# Patient Record
Sex: FEMALE | Race: WHITE
[De-identification: names, ages, dates, MRNs, and addresses within clinical notes are randomized per-mention and may not be internally consistent; named-entity substitution may affect disease eponyms.]

---

## 2017-06-05 ENCOUNTER — HOSPITAL ENCOUNTER (OUTPATIENT)
Dept: HOSPITAL 100 - MS2 | Age: 37
Setting detail: OBSERVATION
LOS: 3 days | Discharge: HOME | DRG: 773 | End: 2017-06-08
Payer: MEDICAID

## 2017-06-05 VITALS
HEART RATE: 71 BPM | DIASTOLIC BLOOD PRESSURE: 56 MMHG | TEMPERATURE: 98.42 F | SYSTOLIC BLOOD PRESSURE: 99 MMHG | OXYGEN SATURATION: 97 % | RESPIRATION RATE: 16 BRPM

## 2017-06-05 VITALS
DIASTOLIC BLOOD PRESSURE: 51 MMHG | TEMPERATURE: 97.9 F | SYSTOLIC BLOOD PRESSURE: 103 MMHG | RESPIRATION RATE: 16 BRPM | HEART RATE: 74 BPM

## 2017-06-05 VITALS — OXYGEN SATURATION: 98 %

## 2017-06-05 VITALS — OXYGEN SATURATION: 97 %

## 2017-06-05 VITALS — BODY MASS INDEX: 17.8 KG/M2

## 2017-06-05 VITALS
SYSTOLIC BLOOD PRESSURE: 103 MMHG | TEMPERATURE: 98.2 F | RESPIRATION RATE: 18 BRPM | DIASTOLIC BLOOD PRESSURE: 54 MMHG | HEART RATE: 66 BPM

## 2017-06-05 DIAGNOSIS — M41.9: ICD-10-CM

## 2017-06-05 DIAGNOSIS — K58.0: ICD-10-CM

## 2017-06-05 DIAGNOSIS — F17.200: ICD-10-CM

## 2017-06-05 DIAGNOSIS — G89.29: ICD-10-CM

## 2017-06-05 DIAGNOSIS — F12.90: ICD-10-CM

## 2017-06-05 DIAGNOSIS — F11.23: Primary | ICD-10-CM

## 2017-06-05 DIAGNOSIS — M54.5: ICD-10-CM

## 2017-06-05 DIAGNOSIS — F19.10: ICD-10-CM

## 2017-06-05 LAB
ANION GAP: 5 (ref 5–15)
BUN SERPL-MCNC: 6 MG/DL (ref 7–18)
BUN/CREAT RATIO: 9.1 RATIO (ref 10–20)
CALCIUM SERPL-MCNC: 8.9 MG/DL (ref 8.5–10.1)
CARBON DIOXIDE: 30 MMOL/L (ref 21–32)
CHLORIDE: 107 MMOL/L (ref 98–107)
DEPRECATED RDW RBC: 46 FL (ref 35.1–43.9)
ERYTHROCYTE [DISTWIDTH] IN BLOOD: 13.1 % (ref 11.6–14.6)
EST GLOM FILT RATE - AFR AMER: 132 ML/MIN (ref 60–?)
ESTIMATED CREATININE CLEARANCE: 103.9 ML/MIN
GLUCOSE: 90 MG/DL (ref 70–110)
HCT VFR BLD AUTO: 38.2 % (ref 37–47)
HEMOGLOBIN: 12.9 G/DL (ref 12–15)
HGB BLD-MCNC: 12.9 G/DL (ref 12–15)
MCV RBC: 98.5 FL (ref 81–99)
MEAN CORP HGB CONC: 33.8 G/GL (ref 32–36)
MEAN PLATELET VOL.: 12.4 FL (ref 6.2–12)
PLATELET # BLD: 152 K/MM3 (ref 150–450)
PLATELET COUNT: 152 K/MM3 (ref 150–450)
POTASSIUM: 3.5 MMOL/L (ref 3.5–5.1)
RBC # BLD AUTO: 3.88 M/MM3 (ref 4.2–5.4)
RBC DISTRIBUTION WIDTH CV: 13.1 % (ref 11.6–14.6)
RBC DISTRIBUTION WIDTH SD: 46 FL (ref 35.1–43.9)
SCAN INDICATED ON CBC? Y/N: NO
WBC # BLD AUTO: 6.2 K/MM3 (ref 4.4–11)
WHITE BLOOD COUNT: 6.2 K/MM3 (ref 4.4–11)

## 2017-06-05 PROCEDURE — 80048 BASIC METABOLIC PNL TOTAL CA: CPT

## 2017-06-05 PROCEDURE — 93005 ELECTROCARDIOGRAM TRACING: CPT

## 2017-06-05 PROCEDURE — 85027 COMPLETE CBC AUTOMATED: CPT

## 2017-06-05 PROCEDURE — 99218: CPT

## 2017-06-05 PROCEDURE — 99406 BEHAV CHNG SMOKING 3-10 MIN: CPT

## 2017-06-05 PROCEDURE — G0378 HOSPITAL OBSERVATION PER HR: HCPCS

## 2017-06-05 PROCEDURE — G0379 DIRECT REFER HOSPITAL OBSERV: HCPCS

## 2017-06-06 VITALS
RESPIRATION RATE: 18 BRPM | DIASTOLIC BLOOD PRESSURE: 59 MMHG | HEART RATE: 90 BPM | TEMPERATURE: 98.9 F | SYSTOLIC BLOOD PRESSURE: 84 MMHG

## 2017-06-06 VITALS — OXYGEN SATURATION: 98 %

## 2017-06-06 VITALS
TEMPERATURE: 98.4 F | RESPIRATION RATE: 16 BRPM | SYSTOLIC BLOOD PRESSURE: 94 MMHG | HEART RATE: 74 BPM | DIASTOLIC BLOOD PRESSURE: 56 MMHG | OXYGEN SATURATION: 99 %

## 2017-06-06 VITALS
RESPIRATION RATE: 16 BRPM | HEART RATE: 88 BPM | TEMPERATURE: 98.06 F | SYSTOLIC BLOOD PRESSURE: 103 MMHG | DIASTOLIC BLOOD PRESSURE: 57 MMHG

## 2017-06-06 VITALS
DIASTOLIC BLOOD PRESSURE: 55 MMHG | TEMPERATURE: 98.06 F | HEART RATE: 89 BPM | SYSTOLIC BLOOD PRESSURE: 102 MMHG | RESPIRATION RATE: 18 BRPM

## 2017-06-06 VITALS
HEART RATE: 81 BPM | RESPIRATION RATE: 18 BRPM | DIASTOLIC BLOOD PRESSURE: 77 MMHG | SYSTOLIC BLOOD PRESSURE: 95 MMHG | TEMPERATURE: 98.96 F

## 2017-06-06 RX ADMIN — PRAMIPEXOLE DIHYDROCHLORIDE 0.12 MG: 0.12 TABLET ORAL at 22:12

## 2017-06-06 RX ADMIN — PRAMIPEXOLE DIHYDROCHLORIDE 0.12 MG: 0.12 TABLET ORAL at 11:36

## 2017-06-07 VITALS
RESPIRATION RATE: 16 BRPM | HEART RATE: 67 BPM | TEMPERATURE: 97.88 F | DIASTOLIC BLOOD PRESSURE: 45 MMHG | OXYGEN SATURATION: 98 % | SYSTOLIC BLOOD PRESSURE: 86 MMHG

## 2017-06-07 VITALS
RESPIRATION RATE: 20 BRPM | DIASTOLIC BLOOD PRESSURE: 63 MMHG | HEART RATE: 78 BPM | SYSTOLIC BLOOD PRESSURE: 91 MMHG | TEMPERATURE: 97.9 F

## 2017-06-07 VITALS
HEART RATE: 65 BPM | SYSTOLIC BLOOD PRESSURE: 97 MMHG | TEMPERATURE: 97.5 F | DIASTOLIC BLOOD PRESSURE: 55 MMHG | RESPIRATION RATE: 18 BRPM

## 2017-06-07 VITALS
TEMPERATURE: 97.9 F | DIASTOLIC BLOOD PRESSURE: 63 MMHG | HEART RATE: 72 BPM | SYSTOLIC BLOOD PRESSURE: 91 MMHG | RESPIRATION RATE: 18 BRPM

## 2017-06-07 VITALS
RESPIRATION RATE: 18 BRPM | DIASTOLIC BLOOD PRESSURE: 51 MMHG | SYSTOLIC BLOOD PRESSURE: 94 MMHG | TEMPERATURE: 97.7 F | HEART RATE: 69 BPM

## 2017-06-07 VITALS — OXYGEN SATURATION: 98 %

## 2017-06-07 RX ADMIN — PRAMIPEXOLE DIHYDROCHLORIDE 0.12 MG: 0.12 TABLET ORAL at 22:53

## 2017-06-07 RX ADMIN — PRAMIPEXOLE DIHYDROCHLORIDE 0.12 MG: 0.12 TABLET ORAL at 08:10

## 2017-06-08 VITALS
TEMPERATURE: 97.7 F | SYSTOLIC BLOOD PRESSURE: 108 MMHG | OXYGEN SATURATION: 100 % | HEART RATE: 66 BPM | RESPIRATION RATE: 16 BRPM | DIASTOLIC BLOOD PRESSURE: 59 MMHG

## 2017-06-08 VITALS
SYSTOLIC BLOOD PRESSURE: 100 MMHG | TEMPERATURE: 97.52 F | RESPIRATION RATE: 16 BRPM | DIASTOLIC BLOOD PRESSURE: 52 MMHG | HEART RATE: 56 BPM

## 2017-06-08 VITALS
DIASTOLIC BLOOD PRESSURE: 59 MMHG | HEART RATE: 66 BPM | RESPIRATION RATE: 16 BRPM | TEMPERATURE: 97.7 F | SYSTOLIC BLOOD PRESSURE: 108 MMHG

## 2017-06-08 RX ADMIN — PRAMIPEXOLE DIHYDROCHLORIDE 0.12 MG: 0.12 TABLET ORAL at 09:19

## 2018-01-18 ENCOUNTER — HOSPITAL ENCOUNTER (OUTPATIENT)
Age: 38
Setting detail: OBSERVATION
LOS: 3 days | Discharge: HOME | DRG: 435 | End: 2018-01-21
Payer: MEDICAID

## 2018-01-18 VITALS
SYSTOLIC BLOOD PRESSURE: 97 MMHG | HEART RATE: 60 BPM | DIASTOLIC BLOOD PRESSURE: 51 MMHG | RESPIRATION RATE: 18 BRPM | TEMPERATURE: 98.4 F

## 2018-01-18 VITALS
SYSTOLIC BLOOD PRESSURE: 126 MMHG | TEMPERATURE: 98.1 F | HEART RATE: 56 BPM | RESPIRATION RATE: 16 BRPM | DIASTOLIC BLOOD PRESSURE: 61 MMHG

## 2018-01-18 VITALS
DIASTOLIC BLOOD PRESSURE: 51 MMHG | OXYGEN SATURATION: 100 % | TEMPERATURE: 98.4 F | SYSTOLIC BLOOD PRESSURE: 97 MMHG | RESPIRATION RATE: 18 BRPM | HEART RATE: 60 BPM

## 2018-01-18 VITALS
TEMPERATURE: 98.06 F | SYSTOLIC BLOOD PRESSURE: 126 MMHG | HEART RATE: 56 BPM | DIASTOLIC BLOOD PRESSURE: 61 MMHG | RESPIRATION RATE: 16 BRPM | OXYGEN SATURATION: 100 %

## 2018-01-18 VITALS
RESPIRATION RATE: 16 BRPM | HEART RATE: 71 BPM | SYSTOLIC BLOOD PRESSURE: 103 MMHG | TEMPERATURE: 97.9 F | DIASTOLIC BLOOD PRESSURE: 72 MMHG

## 2018-01-18 VITALS — BODY MASS INDEX: 18.7 KG/M2

## 2018-01-18 DIAGNOSIS — G25.81: ICD-10-CM

## 2018-01-18 DIAGNOSIS — R00.1: ICD-10-CM

## 2018-01-18 DIAGNOSIS — F11.23: Primary | ICD-10-CM

## 2018-01-18 DIAGNOSIS — F17.210: ICD-10-CM

## 2018-01-18 LAB
ALANINE AMINOTRANSFER ALT/SGPT: 22 U/L (ref 12–78)
ALBUMIN SERPL-MCNC: 3.2 G/DL (ref 3.4–5)
ALKALINE PHOSPHATASE: 74 U/L (ref 45–117)
ANION GAP: 5 (ref 5–15)
AST(SGOT): 23 U/L (ref 15–37)
BUN SERPL-MCNC: 10 MG/DL (ref 7–18)
BUN/CREAT RATIO: 15.2 RATIO (ref 10–20)
CALCIUM SERPL-MCNC: 8.5 MG/DL (ref 8.5–10.1)
CARBON DIOXIDE: 30 MMOL/L (ref 21–32)
CHLORIDE: 105 MMOL/L (ref 98–107)
DEPRECATED RDW RBC: 48.2 FL (ref 35.1–43.9)
DIFFERENTIAL INDICATED: (no result)
ERYTHROCYTE [DISTWIDTH] IN BLOOD: 13.4 % (ref 11.6–14.6)
EST GLOM FILT RATE - AFR AMER: 131 ML/MIN (ref 60–?)
ESTIMATED CREATININE CLEARANCE: 104.79 ML/MIN
GLOBULIN: 3.4 G/DL (ref 2.2–4.2)
GLUCOSE: 110 MG/DL (ref 70–110)
HCT VFR BLD AUTO: 35.6 % (ref 37–47)
HEMOGLOBIN: 11.9 G/DL (ref 12–15)
HGB BLD-MCNC: 11.9 G/DL (ref 12–15)
IMMATURE GRANULOCYTES COUNT: 0.01 X10^3/UL (ref 0–0)
MCV RBC: 99.7 FL (ref 81–99)
MEAN CORP HGB CONC: 33.4 G/GL (ref 32–36)
MEAN PLATELET VOL.: 11.9 FL (ref 6.2–12)
PLATELET # BLD: 141 K/MM3 (ref 150–450)
PLATELET COUNT: 141 K/MM3 (ref 150–450)
POSITIVE COUNT: NO
POSITIVE DIFFERENTIAL: NO
POSITIVE MORPHOLOGY: NO
POTASSIUM: 3.5 MMOL/L (ref 3.5–5.1)
RBC # BLD AUTO: 3.57 M/MM3 (ref 4.2–5.4)
RBC DISTRIBUTION WIDTH CV: 13.4 % (ref 11.6–14.6)
RBC DISTRIBUTION WIDTH SD: 48.2 FL (ref 35.1–43.9)
WBC # BLD AUTO: 5.7 K/MM3 (ref 4.4–11)
WHITE BLOOD COUNT: 5.7 K/MM3 (ref 4.4–11)

## 2018-01-18 PROCEDURE — 36415 COLL VENOUS BLD VENIPUNCTURE: CPT

## 2018-01-18 PROCEDURE — 86708 HEPATITIS A ANTIBODY: CPT

## 2018-01-18 PROCEDURE — 86706 HEP B SURFACE ANTIBODY: CPT

## 2018-01-18 PROCEDURE — 86703 HIV-1/HIV-2 1 RESULT ANTBDY: CPT

## 2018-01-18 PROCEDURE — A4216 STERILE WATER/SALINE, 10 ML: HCPCS

## 2018-01-18 PROCEDURE — 87340 HEPATITIS B SURFACE AG IA: CPT

## 2018-01-18 PROCEDURE — 99218: CPT

## 2018-01-18 PROCEDURE — 86705 HEP B CORE ANTIBODY IGM: CPT

## 2018-01-18 PROCEDURE — G0379 DIRECT REFER HOSPITAL OBSERV: HCPCS

## 2018-01-18 PROCEDURE — 86704 HEP B CORE ANTIBODY TOTAL: CPT

## 2018-01-18 PROCEDURE — 86709 HEPATITIS A IGM ANTIBODY: CPT

## 2018-01-18 PROCEDURE — 99406 BEHAV CHNG SMOKING 3-10 MIN: CPT

## 2018-01-18 PROCEDURE — 85025 COMPLETE CBC W/AUTO DIFF WBC: CPT

## 2018-01-18 PROCEDURE — 84443 ASSAY THYROID STIM HORMONE: CPT

## 2018-01-18 PROCEDURE — 97802 MEDICAL NUTRITION INDIV IN: CPT

## 2018-01-18 PROCEDURE — G0378 HOSPITAL OBSERVATION PER HR: HCPCS

## 2018-01-18 PROCEDURE — 86803 HEPATITIS C AB TEST: CPT

## 2018-01-18 PROCEDURE — 80048 BASIC METABOLIC PNL TOTAL CA: CPT

## 2018-01-18 PROCEDURE — 80053 COMPREHEN METABOLIC PANEL: CPT

## 2018-01-19 VITALS
HEART RATE: 57 BPM | DIASTOLIC BLOOD PRESSURE: 62 MMHG | TEMPERATURE: 98.1 F | SYSTOLIC BLOOD PRESSURE: 111 MMHG | RESPIRATION RATE: 18 BRPM

## 2018-01-19 VITALS
HEART RATE: 53 BPM | TEMPERATURE: 98.7 F | SYSTOLIC BLOOD PRESSURE: 112 MMHG | DIASTOLIC BLOOD PRESSURE: 72 MMHG | RESPIRATION RATE: 18 BRPM

## 2018-01-19 VITALS
DIASTOLIC BLOOD PRESSURE: 58 MMHG | SYSTOLIC BLOOD PRESSURE: 108 MMHG | OXYGEN SATURATION: 97 % | TEMPERATURE: 98.78 F | RESPIRATION RATE: 18 BRPM | HEART RATE: 65 BPM

## 2018-01-19 VITALS
TEMPERATURE: 97.88 F | OXYGEN SATURATION: 98 % | DIASTOLIC BLOOD PRESSURE: 57 MMHG | SYSTOLIC BLOOD PRESSURE: 118 MMHG | RESPIRATION RATE: 16 BRPM | HEART RATE: 44 BPM

## 2018-01-19 VITALS
TEMPERATURE: 98.06 F | OXYGEN SATURATION: 99 % | RESPIRATION RATE: 18 BRPM | HEART RATE: 57 BPM | DIASTOLIC BLOOD PRESSURE: 62 MMHG | SYSTOLIC BLOOD PRESSURE: 111 MMHG

## 2018-01-19 VITALS
DIASTOLIC BLOOD PRESSURE: 57 MMHG | RESPIRATION RATE: 16 BRPM | HEART RATE: 44 BPM | SYSTOLIC BLOOD PRESSURE: 118 MMHG | TEMPERATURE: 98 F

## 2018-01-19 VITALS
SYSTOLIC BLOOD PRESSURE: 108 MMHG | TEMPERATURE: 98.7 F | DIASTOLIC BLOOD PRESSURE: 58 MMHG | HEART RATE: 65 BPM | RESPIRATION RATE: 18 BRPM

## 2018-01-19 VITALS
HEART RATE: 60 BPM | DIASTOLIC BLOOD PRESSURE: 63 MMHG | TEMPERATURE: 98.06 F | SYSTOLIC BLOOD PRESSURE: 115 MMHG | RESPIRATION RATE: 18 BRPM

## 2018-01-19 VITALS — HEART RATE: 60 BPM

## 2018-01-19 LAB
ANION GAP: 5 (ref 5–15)
BUN SERPL-MCNC: 7 MG/DL (ref 7–18)
BUN/CREAT RATIO: 10.2 RATIO (ref 10–20)
CALCIUM SERPL-MCNC: 9 MG/DL (ref 8.5–10.1)
CARBON DIOXIDE: 25 MMOL/L (ref 21–32)
CHLORIDE: 112 MMOL/L (ref 98–107)
DEPRECATED RDW RBC: 48.6 FL (ref 35.1–43.9)
DIFFERENTIAL INDICATED: (no result)
ERYTHROCYTE [DISTWIDTH] IN BLOOD: 13.5 % (ref 11.6–14.6)
EST GLOM FILT RATE - AFR AMER: 126 ML/MIN (ref 60–?)
ESTIMATED CREATININE CLEARANCE: 101.7 ML/MIN
GLUCOSE: 105 MG/DL (ref 70–110)
HCT VFR BLD AUTO: 38.8 % (ref 37–47)
HEMOGLOBIN: 12.8 G/DL (ref 12–15)
HGB BLD-MCNC: 12.8 G/DL (ref 12–15)
IMMATURE GRANULOCYTES COUNT: 0 X10^3/UL (ref 0–0)
MCV RBC: 98.7 FL (ref 81–99)
MEAN CORP HGB CONC: 33 G/GL (ref 32–36)
MEAN PLATELET VOL.: 12.2 FL (ref 6.2–12)
PLATELET # BLD: 156 K/MM3 (ref 150–450)
PLATELET COUNT: 156 K/MM3 (ref 150–450)
POSITIVE COUNT: NO
POSITIVE DIFFERENTIAL: NO
POSITIVE MORPHOLOGY: NO
POTASSIUM: 4 MMOL/L (ref 3.5–5.1)
RBC # BLD AUTO: 3.93 M/MM3 (ref 4.2–5.4)
RBC DISTRIBUTION WIDTH CV: 13.5 % (ref 11.6–14.6)
RBC DISTRIBUTION WIDTH SD: 48.6 FL (ref 35.1–43.9)
WBC # BLD AUTO: 5.2 K/MM3 (ref 4.4–11)
WHITE BLOOD COUNT: 5.2 K/MM3 (ref 4.4–11)

## 2018-01-20 VITALS
RESPIRATION RATE: 16 BRPM | DIASTOLIC BLOOD PRESSURE: 49 MMHG | SYSTOLIC BLOOD PRESSURE: 97 MMHG | HEART RATE: 64 BPM | TEMPERATURE: 98.24 F

## 2018-01-20 VITALS
SYSTOLIC BLOOD PRESSURE: 117 MMHG | HEART RATE: 48 BPM | TEMPERATURE: 98.42 F | RESPIRATION RATE: 14 BRPM | DIASTOLIC BLOOD PRESSURE: 61 MMHG

## 2018-01-20 VITALS — RESPIRATION RATE: 16 BRPM | OXYGEN SATURATION: 99 % | HEART RATE: 60 BPM

## 2018-01-20 VITALS
TEMPERATURE: 98.42 F | RESPIRATION RATE: 16 BRPM | SYSTOLIC BLOOD PRESSURE: 111 MMHG | HEART RATE: 60 BPM | DIASTOLIC BLOOD PRESSURE: 61 MMHG

## 2018-01-20 VITALS
SYSTOLIC BLOOD PRESSURE: 95 MMHG | HEART RATE: 48 BPM | TEMPERATURE: 98.1 F | DIASTOLIC BLOOD PRESSURE: 52 MMHG | RESPIRATION RATE: 14 BRPM

## 2018-01-20 VITALS
SYSTOLIC BLOOD PRESSURE: 105 MMHG | DIASTOLIC BLOOD PRESSURE: 44 MMHG | RESPIRATION RATE: 16 BRPM | TEMPERATURE: 97.88 F | HEART RATE: 51 BPM

## 2018-01-20 VITALS
TEMPERATURE: 98.4 F | OXYGEN SATURATION: 99 % | RESPIRATION RATE: 16 BRPM | DIASTOLIC BLOOD PRESSURE: 61 MMHG | HEART RATE: 60 BPM | SYSTOLIC BLOOD PRESSURE: 111 MMHG

## 2018-01-20 VITALS
OXYGEN SATURATION: 99 % | TEMPERATURE: 98.1 F | DIASTOLIC BLOOD PRESSURE: 52 MMHG | HEART RATE: 46 BPM | RESPIRATION RATE: 18 BRPM | SYSTOLIC BLOOD PRESSURE: 95 MMHG

## 2018-01-20 LAB
ANION GAP: 7 (ref 5–15)
BUN SERPL-MCNC: 14 MG/DL (ref 7–18)
BUN/CREAT RATIO: 18.6 RATIO (ref 10–20)
CALCIUM SERPL-MCNC: 8.8 MG/DL (ref 8.5–10.1)
CARBON DIOXIDE: 25 MMOL/L (ref 21–32)
CHLORIDE: 110 MMOL/L (ref 98–107)
DEPRECATED RDW RBC: 48.1 FL (ref 35.1–43.9)
DIFFERENTIAL INDICATED: (no result)
ERYTHROCYTE [DISTWIDTH] IN BLOOD: 13.5 % (ref 11.6–14.6)
EST GLOM FILT RATE - AFR AMER: 113 ML/MIN (ref 60–?)
ESTIMATED CREATININE CLEARANCE: 92.21 ML/MIN
GLUCOSE: 96 MG/DL (ref 70–110)
HCT VFR BLD AUTO: 38.2 % (ref 37–47)
HEMOGLOBIN: 12.6 G/DL (ref 12–15)
HGB BLD-MCNC: 12.6 G/DL (ref 12–15)
IMMATURE GRANULOCYTES COUNT: 0 X10^3/UL (ref 0–0)
MCV RBC: 98.5 FL (ref 81–99)
MEAN CORP HGB CONC: 33 G/GL (ref 32–36)
MEAN PLATELET VOL.: 12.8 FL (ref 6.2–12)
PLATELET # BLD: 158 K/MM3 (ref 150–450)
PLATELET COUNT: 158 K/MM3 (ref 150–450)
POSITIVE COUNT: NO
POSITIVE DIFFERENTIAL: NO
POSITIVE MORPHOLOGY: NO
POTASSIUM: 3.7 MMOL/L (ref 3.5–5.1)
RBC # BLD AUTO: 3.88 M/MM3 (ref 4.2–5.4)
RBC DISTRIBUTION WIDTH CV: 13.5 % (ref 11.6–14.6)
RBC DISTRIBUTION WIDTH SD: 48.1 FL (ref 35.1–43.9)
WBC # BLD AUTO: 6.3 K/MM3 (ref 4.4–11)
WHITE BLOOD COUNT: 6.3 K/MM3 (ref 4.4–11)

## 2018-01-21 VITALS
SYSTOLIC BLOOD PRESSURE: 120 MMHG | TEMPERATURE: 98.1 F | RESPIRATION RATE: 16 BRPM | HEART RATE: 46 BPM | DIASTOLIC BLOOD PRESSURE: 63 MMHG

## 2018-01-21 VITALS
SYSTOLIC BLOOD PRESSURE: 116 MMHG | TEMPERATURE: 98.2 F | RESPIRATION RATE: 16 BRPM | DIASTOLIC BLOOD PRESSURE: 66 MMHG | HEART RATE: 50 BPM

## 2018-01-21 VITALS
HEART RATE: 46 BPM | TEMPERATURE: 98.06 F | SYSTOLIC BLOOD PRESSURE: 120 MMHG | DIASTOLIC BLOOD PRESSURE: 63 MMHG | OXYGEN SATURATION: 100 % | RESPIRATION RATE: 16 BRPM

## 2018-01-21 VITALS
DIASTOLIC BLOOD PRESSURE: 66 MMHG | RESPIRATION RATE: 16 BRPM | OXYGEN SATURATION: 98 % | HEART RATE: 50 BPM | SYSTOLIC BLOOD PRESSURE: 116 MMHG | TEMPERATURE: 98.2 F

## 2018-01-21 VITALS
TEMPERATURE: 98.2 F | HEART RATE: 45 BPM | SYSTOLIC BLOOD PRESSURE: 112 MMHG | RESPIRATION RATE: 16 BRPM | DIASTOLIC BLOOD PRESSURE: 64 MMHG

## 2018-06-13 ENCOUNTER — PREP FOR SURGERY (OUTPATIENT)
Dept: OTHER | Facility: HOSPITAL | Age: 38
End: 2018-06-13

## 2018-06-13 ENCOUNTER — OFFICE VISIT (OUTPATIENT)
Dept: OBSTETRICS AND GYNECOLOGY | Facility: CLINIC | Age: 38
End: 2018-06-13

## 2018-06-13 VITALS
DIASTOLIC BLOOD PRESSURE: 70 MMHG | BODY MASS INDEX: 21.82 KG/M2 | HEIGHT: 68 IN | SYSTOLIC BLOOD PRESSURE: 116 MMHG | WEIGHT: 144 LBS

## 2018-06-13 DIAGNOSIS — Z01.419 ENCOUNTER FOR WELL WOMAN EXAM WITH ROUTINE GYNECOLOGICAL EXAM: Primary | ICD-10-CM

## 2018-06-13 DIAGNOSIS — N80.9 ENDOMETRIOSIS: Primary | ICD-10-CM

## 2018-06-13 DIAGNOSIS — N94.6 DYSMENORRHEA: ICD-10-CM

## 2018-06-13 DIAGNOSIS — N94.10 DYSPAREUNIA, FEMALE: ICD-10-CM

## 2018-06-13 DIAGNOSIS — N92.0 MENORRHAGIA WITH REGULAR CYCLE: ICD-10-CM

## 2018-06-13 PROCEDURE — 99385 PREV VISIT NEW AGE 18-39: CPT | Performed by: OBSTETRICS & GYNECOLOGY

## 2018-06-13 RX ORDER — CEFAZOLIN SODIUM 2 G/100ML
2 INJECTION, SOLUTION INTRAVENOUS
Status: CANCELLED | OUTPATIENT
Start: 2018-06-13

## 2018-06-13 RX ORDER — VENLAFAXINE HYDROCHLORIDE 150 MG/1
CAPSULE, EXTENDED RELEASE ORAL
Refills: 2 | Status: ON HOLD | COMMUNITY
Start: 2018-05-29 | End: 2018-10-10 | Stop reason: SDUPTHER

## 2018-06-13 RX ORDER — SODIUM CHLORIDE, SODIUM LACTATE, POTASSIUM CHLORIDE, CALCIUM CHLORIDE 600; 310; 30; 20 MG/100ML; MG/100ML; MG/100ML; MG/100ML
125 INJECTION, SOLUTION INTRAVENOUS CONTINUOUS
Status: CANCELLED | OUTPATIENT
Start: 2018-06-13

## 2018-06-13 RX ORDER — SODIUM CHLORIDE 0.9 % (FLUSH) 0.9 %
1-10 SYRINGE (ML) INJECTION AS NEEDED
Status: CANCELLED | OUTPATIENT
Start: 2018-06-13

## 2018-06-13 RX ORDER — ACYCLOVIR 400 MG/1
400 TABLET ORAL 3 TIMES DAILY
Qty: 15 TABLET | Refills: 6 | Status: SHIPPED | OUTPATIENT
Start: 2018-06-13 | End: 2019-01-10 | Stop reason: SDUPTHER

## 2018-06-13 RX ORDER — IBUPROFEN 600 MG/1
600 TABLET ORAL EVERY 8 HOURS PRN
Status: ON HOLD | COMMUNITY
Start: 2018-02-25 | End: 2018-10-11

## 2018-06-13 RX ORDER — DOXEPIN HYDROCHLORIDE 10 MG/1
CAPSULE ORAL
Refills: 2 | COMMUNITY
Start: 2018-06-04 | End: 2018-10-31
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
## 2018-06-14 PROBLEM — N80.9 ENDOMETRIOSIS: Status: ACTIVE | Noted: 2018-06-14

## 2018-06-22 ENCOUNTER — TELEPHONE (OUTPATIENT)
Dept: OBSTETRICS AND GYNECOLOGY | Facility: CLINIC | Age: 38
End: 2018-06-22

## 2018-06-22 NOTE — TELEPHONE ENCOUNTER
Isabelle called today to say she is in a rehab program for 1 year and she has been there for about 4 months already. Her question/concern is how important is it that she have the Hysterectomy now?  Can she wait, should she wait?

## 2018-06-25 NOTE — TELEPHONE ENCOUNTER
I think an option be to perform outpatient laparoscopy and an endometrial ablation at the same setting under general anesthesia.  She could recover quicker and require less pain medication.

## 2018-06-26 NOTE — TELEPHONE ENCOUNTER
After speaking with Isabelle and giving her your options she has decided to move forward with the scheduled  TL on July 23, 2018.

## 2018-10-01 ENCOUNTER — APPOINTMENT (OUTPATIENT)
Dept: PREADMISSION TESTING | Facility: HOSPITAL | Age: 38
End: 2018-10-01

## 2018-10-01 ENCOUNTER — OFFICE VISIT (OUTPATIENT)
Dept: OBSTETRICS AND GYNECOLOGY | Facility: CLINIC | Age: 38
End: 2018-10-01

## 2018-10-01 VITALS
BODY MASS INDEX: 23.46 KG/M2 | SYSTOLIC BLOOD PRESSURE: 116 MMHG | WEIGHT: 153 LBS | DIASTOLIC BLOOD PRESSURE: 70 MMHG | RESPIRATION RATE: 16 BRPM

## 2018-10-01 VITALS — HEIGHT: 68 IN | BODY MASS INDEX: 23.36 KG/M2 | WEIGHT: 154.1 LBS

## 2018-10-01 DIAGNOSIS — Z01.818 PRE-OP EXAM: Primary | ICD-10-CM

## 2018-10-01 DIAGNOSIS — N80.9 ENDOMETRIOSIS DETERMINED BY LAPAROSCOPY: ICD-10-CM

## 2018-10-01 DIAGNOSIS — N80.9 ENDOMETRIOSIS: ICD-10-CM

## 2018-10-01 LAB
BASOPHILS # BLD AUTO: 0.05 10*3/MM3 (ref 0–0.2)
BASOPHILS NFR BLD AUTO: 0.4 % (ref 0–1)
DEPRECATED RDW RBC AUTO: 50.7 FL (ref 37–54)
EOSINOPHIL # BLD AUTO: 0 10*3/MM3 (ref 0–0.3)
EOSINOPHIL NFR BLD AUTO: 0 % (ref 0–3)
ERYTHROCYTE [DISTWIDTH] IN BLOOD BY AUTOMATED COUNT: 13.4 % (ref 11.3–14.5)
HCT VFR BLD AUTO: 37 % (ref 34.5–44)
HGB BLD-MCNC: 11.9 G/DL (ref 11.5–15.5)
IMM GRANULOCYTES # BLD: 0.04 10*3/MM3 (ref 0–0.03)
IMM GRANULOCYTES NFR BLD: 0.3 % (ref 0–0.6)
LYMPHOCYTES # BLD AUTO: 1.66 10*3/MM3 (ref 0.6–4.8)
LYMPHOCYTES NFR BLD AUTO: 12 % (ref 24–44)
MCH RBC QN AUTO: 32.8 PG (ref 27–31)
MCHC RBC AUTO-ENTMCNC: 32.2 G/DL (ref 32–36)
MCV RBC AUTO: 101.9 FL (ref 80–99)
MONOCYTES # BLD AUTO: 0.26 10*3/MM3 (ref 0–1)
MONOCYTES NFR BLD AUTO: 1.9 % (ref 0–12)
NEUTROPHILS # BLD AUTO: 11.86 10*3/MM3 (ref 1.5–8.3)
NEUTROPHILS NFR BLD AUTO: 85.7 % (ref 41–71)
PLATELET # BLD AUTO: 227 10*3/MM3 (ref 150–450)
PMV BLD AUTO: 12 FL (ref 6–12)
RBC # BLD AUTO: 3.63 10*6/MM3 (ref 3.89–5.14)
WBC NRBC COR # BLD: 13.83 10*3/MM3 (ref 3.5–10.8)

## 2018-10-01 PROCEDURE — 36415 COLL VENOUS BLD VENIPUNCTURE: CPT

## 2018-10-01 PROCEDURE — 85025 COMPLETE CBC W/AUTO DIFF WBC: CPT | Performed by: OBSTETRICS & GYNECOLOGY

## 2018-10-01 PROCEDURE — S0260 H&P FOR SURGERY: HCPCS | Performed by: OBSTETRICS & GYNECOLOGY

## 2018-10-01 RX ORDER — FLUCONAZOLE 150 MG/1
150 TABLET ORAL DAILY
Qty: 1 TABLET | Refills: 0 | Status: ON HOLD | OUTPATIENT
Start: 2018-10-01 | End: 2018-10-10

## 2018-10-01 RX ORDER — VENLAFAXINE HYDROCHLORIDE 150 MG/1
150 CAPSULE, EXTENDED RELEASE ORAL DAILY
COMMUNITY

## 2018-10-01 RX ORDER — DOXEPIN HYDROCHLORIDE 10 MG/1
10 CAPSULE ORAL NIGHTLY
Status: ON HOLD | COMMUNITY
End: 2018-10-10 | Stop reason: SDUPTHER

## 2018-10-01 NOTE — DISCHARGE INSTRUCTIONS

## 2018-10-01 NOTE — H&P
Subjective   Isabelle Mahoney is a 37 y.o. year old  who is scheduled  for surgery due to chronic pelvic pain associated with endometriosis.  Discussed that her surgery will be at 8:00 she does not want to go on that same day would rather go home the next day, Thursday, .  Procedure discussed.  We'll be as conservative as possible with her ovaries.  She is concerned about postop pain medication.  She is in recovery 6 months now.  She is not on any kind of medication.  Her father will be dropping her off and taking her home.  Her mother will be taking care of her postoperatively.  The parents are .  Discussed that we probably will give her Motrin or cord around the clock along with Tylenol as baseline with minimal number of oxycodone.  She had a question regarding being put to sleep.  She had oral surgery and the propofol they gave was not sufficient.  This was in an office setting.  Reassured her anesthesia will be a with take care of things for her she needs to remind him of her drug history and his recent oral surgery.  She is also some pack per day smoker and uses vapor.  Anesthesia did not want her smoking after midnight.  Discussed that she should hopefully be able to void smoking until she gets to the hospital.  We can use a nicotine patch while she is at the hospital.  She is complaining of a discharge while on antibiotics she went to urgent treatment Center for upper respiratory infection recently.  I will call her in 1 Diflucan to use if she develops itching.    Past Medical History:   Diagnosis Date   • Anxiety    • Asthma    • Depression    • Endometriosis    • HSV infection      Past Surgical History:   Procedure Laterality Date   • DIAGNOSTIC LAPAROSCOPY      times 2     Social History     Social History   • Marital status: Single     Social History Main Topics   • Smoking status: Heavy Tobacco Smoker     Packs/day: 1.50   • Smokeless tobacco: Never Used   • Alcohol use No   •  Drug use: No   • Sexual activity: Not Currently     Other Topics Concern   • Not on file       Current Outpatient Prescriptions:   •  acyclovir (ZOVIRAX) 400 MG tablet, Take 1 tablet by mouth 3 (Three) Times a Day., Disp: 15 tablet, Rfl: 6  •  albuterol (PROVENTIL HFA;VENTOLIN HFA) 108 (90 Base) MCG/ACT inhaler, Inhale 2 puffs Every 4 (Four) Hours As Needed for Wheezing for up to 5 days., Disp: 1 inhaler, Rfl: 0  •  amoxicillin-clavulanate (AUGMENTIN) 875-125 MG per tablet, Take 1 tablet by mouth 2 (Two) Times a Day for 7 days., Disp: 14 tablet, Rfl: 0  •  doxepin (SINEquan) 10 MG capsule, TAKE 1 CAPSULE BY MOUTH AT BEDTIME AS NEEDED FOR INSOMNIA, Disp: , Rfl: 2  •  ibuprofen (ADVIL,MOTRIN) 600 MG tablet, Take 600 mg by mouth Every 8 (Eight) Hours As Needed., Disp: , Rfl:   •  predniSONE (DELTASONE) 50 MG tablet, Take 1 tablet by mouth Daily for 5 days., Disp: 5 tablet, Rfl: 0  •  venlafaxine XR (EFFEXOR-XR) 150 MG 24 hr capsule, BOTTLE #2: AFTER COMPLETING THE FIRST 7 DAYS ON THE LOWER DOSAGE BOTTLE, TAKE 1 CAPSULE DAILY, Disp: , Rfl: 2    No Known Allergies  Smoking status: Heavy Tobacco Smoker                                                       Packs/day: 1.50      Years: 0.00      Smokeless tobacco: Never Used                        Review of Systems normal bladder however she's constipated.  Discussed that that is somewhat associated with endometriosis.      Objective   /70   Resp 16   Wt 69.4 kg (153 lb)   LMP 09/09/2018   BMI 23.46 kg/m²   General: well developed; well nourished  no acute distress  appears stated age   Heart: regular rate and rhythm   Lungs: breathing is unlabored  clear to auscultation bilaterally   Abdomen: no umbilical or inginual hernias are present  no hepato-splenomegaly  Or tenderness in the right lower quadrant   Pelvis:: Not performed.        Assessment   1. Chronic pelvic pain associated with endometriosis.  She has more right-sided pain than left.  Discussed  conservative ovary management.  2. Constipation  3. 1+ pack per day smoker will need nicotine patch  4. In recovery will affect pain management.  We'll discuss with her father when she goes home postop day #1     Plan   1. As above.  Preadmission testing I think today  2. Instructed on nothing by mouth status douching and milk of magnesia day prior  3. Total laparoscopic hysterectomy bilateral salpingectomy scheduled for Wednesday, September 10      Israel Murphy M.D.  10/1/2018

## 2018-10-09 ENCOUNTER — ANESTHESIA EVENT (OUTPATIENT)
Dept: PERIOP | Facility: HOSPITAL | Age: 38
End: 2018-10-09

## 2018-10-10 ENCOUNTER — HOSPITAL ENCOUNTER (OUTPATIENT)
Facility: HOSPITAL | Age: 38
Discharge: HOME | End: 2018-10-11
Attending: OBSTETRICS & GYNECOLOGY | Admitting: OBSTETRICS & GYNECOLOGY

## 2018-10-10 ENCOUNTER — ANESTHESIA (OUTPATIENT)
Dept: PERIOP | Facility: HOSPITAL | Age: 38
End: 2018-10-10

## 2018-10-10 DIAGNOSIS — N80.9 ENDOMETRIOSIS: ICD-10-CM

## 2018-10-10 PROBLEM — F17.210 CIGARETTE SMOKER: Status: ACTIVE | Noted: 2018-10-10

## 2018-10-10 LAB
B-HCG UR QL: NEGATIVE
INTERNAL NEGATIVE CONTROL: NEGATIVE
INTERNAL POSITIVE CONTROL: POSITIVE
Lab: NORMAL

## 2018-10-10 PROCEDURE — 25010000002 FENTANYL CITRATE (PF) 100 MCG/2ML SOLUTION: Performed by: NURSE ANESTHETIST, CERTIFIED REGISTERED

## 2018-10-10 PROCEDURE — 58571 TLH W/T/O 250 G OR LESS: CPT | Performed by: OBSTETRICS & GYNECOLOGY

## 2018-10-10 PROCEDURE — 25010000002 KETOROLAC TROMETHAMINE PER 15 MG: Performed by: OBSTETRICS & GYNECOLOGY

## 2018-10-10 PROCEDURE — 25010000002 NEOSTIGMINE 10 MG/10ML SOLUTION: Performed by: NURSE ANESTHETIST, CERTIFIED REGISTERED

## 2018-10-10 PROCEDURE — 81025 URINE PREGNANCY TEST: CPT | Performed by: ANESTHESIOLOGY

## 2018-10-10 PROCEDURE — 25010000003 CEFAZOLIN IN DEXTROSE 2-4 GM/100ML-% SOLUTION: Performed by: OBSTETRICS & GYNECOLOGY

## 2018-10-10 PROCEDURE — 25010000002 KETOROLAC TROMETHAMINE PER 15 MG: Performed by: NURSE ANESTHETIST, CERTIFIED REGISTERED

## 2018-10-10 PROCEDURE — 25010000002 HYDROMORPHONE PER 4 MG: Performed by: NURSE ANESTHETIST, CERTIFIED REGISTERED

## 2018-10-10 PROCEDURE — 25010000002 DEXAMETHASONE PER 1 MG: Performed by: NURSE ANESTHETIST, CERTIFIED REGISTERED

## 2018-10-10 PROCEDURE — 25010000002 ONDANSETRON PER 1 MG: Performed by: NURSE ANESTHETIST, CERTIFIED REGISTERED

## 2018-10-10 PROCEDURE — 25010000002 PROMETHAZINE PER 50 MG: Performed by: NURSE ANESTHETIST, CERTIFIED REGISTERED

## 2018-10-10 PROCEDURE — 88305 TISSUE EXAM BY PATHOLOGIST: CPT | Performed by: OBSTETRICS & GYNECOLOGY

## 2018-10-10 PROCEDURE — 25010000002 PROPOFOL 10 MG/ML EMULSION: Performed by: NURSE ANESTHETIST, CERTIFIED REGISTERED

## 2018-10-10 PROCEDURE — 58662 LAPAROSCOPY EXCISE LESIONS: CPT | Performed by: OBSTETRICS & GYNECOLOGY

## 2018-10-10 PROCEDURE — G0378 HOSPITAL OBSERVATION PER HR: HCPCS

## 2018-10-10 PROCEDURE — 25010000002 PHENYLEPHRINE PER 1 ML: Performed by: NURSE ANESTHETIST, CERTIFIED REGISTERED

## 2018-10-10 RX ORDER — SODIUM CHLORIDE 0.9 % (FLUSH) 0.9 %
1-10 SYRINGE (ML) INJECTION AS NEEDED
Status: DISCONTINUED | OUTPATIENT
Start: 2018-10-10 | End: 2018-10-10

## 2018-10-10 RX ORDER — SODIUM CHLORIDE 0.9 % (FLUSH) 0.9 %
3-10 SYRINGE (ML) INJECTION AS NEEDED
Status: DISCONTINUED | OUTPATIENT
Start: 2018-10-10 | End: 2018-10-10

## 2018-10-10 RX ORDER — KETOROLAC TROMETHAMINE 30 MG/ML
INJECTION, SOLUTION INTRAMUSCULAR; INTRAVENOUS AS NEEDED
Status: DISCONTINUED | OUTPATIENT
Start: 2018-10-10 | End: 2018-10-10 | Stop reason: SURG

## 2018-10-10 RX ORDER — HYDROCODONE BITARTRATE AND ACETAMINOPHEN 10; 325 MG/1; MG/1
1 TABLET ORAL EVERY 6 HOURS PRN
Status: DISCONTINUED | OUTPATIENT
Start: 2018-10-10 | End: 2018-10-11 | Stop reason: HOSPADM

## 2018-10-10 RX ORDER — ONDANSETRON 2 MG/ML
INJECTION INTRAMUSCULAR; INTRAVENOUS AS NEEDED
Status: DISCONTINUED | OUTPATIENT
Start: 2018-10-10 | End: 2018-10-10 | Stop reason: SURG

## 2018-10-10 RX ORDER — CEFAZOLIN SODIUM 2 G/100ML
2 INJECTION, SOLUTION INTRAVENOUS
Status: DISCONTINUED | OUTPATIENT
Start: 2018-10-10 | End: 2018-10-10 | Stop reason: HOSPADM

## 2018-10-10 RX ORDER — HYDROMORPHONE HYDROCHLORIDE 1 MG/ML
0.5 INJECTION, SOLUTION INTRAMUSCULAR; INTRAVENOUS; SUBCUTANEOUS
Status: COMPLETED | OUTPATIENT
Start: 2018-10-10 | End: 2018-10-10

## 2018-10-10 RX ORDER — ZOLPIDEM TARTRATE 5 MG/1
5 TABLET ORAL NIGHTLY PRN
Status: DISCONTINUED | OUTPATIENT
Start: 2018-10-10 | End: 2018-10-11 | Stop reason: HOSPADM

## 2018-10-10 RX ORDER — DOCUSATE SODIUM 100 MG/1
100 CAPSULE, LIQUID FILLED ORAL 2 TIMES DAILY PRN
Status: DISCONTINUED | OUTPATIENT
Start: 2018-10-10 | End: 2018-10-11 | Stop reason: HOSPADM

## 2018-10-10 RX ORDER — SODIUM CHLORIDE, SODIUM LACTATE, POTASSIUM CHLORIDE, CALCIUM CHLORIDE 600; 310; 30; 20 MG/100ML; MG/100ML; MG/100ML; MG/100ML
125 INJECTION, SOLUTION INTRAVENOUS CONTINUOUS
Status: DISCONTINUED | OUTPATIENT
Start: 2018-10-10 | End: 2018-10-10

## 2018-10-10 RX ORDER — LIDOCAINE HYDROCHLORIDE 10 MG/ML
INJECTION, SOLUTION EPIDURAL; INFILTRATION; INTRACAUDAL; PERINEURAL AS NEEDED
Status: DISCONTINUED | OUTPATIENT
Start: 2018-10-10 | End: 2018-10-10 | Stop reason: SURG

## 2018-10-10 RX ORDER — DEXTROSE AND SODIUM CHLORIDE 5; .45 G/100ML; G/100ML
100 INJECTION, SOLUTION INTRAVENOUS CONTINUOUS
Status: DISCONTINUED | OUTPATIENT
Start: 2018-10-10 | End: 2018-10-11 | Stop reason: HOSPADM

## 2018-10-10 RX ORDER — PROMETHAZINE HYDROCHLORIDE 25 MG/ML
12.5 INJECTION, SOLUTION INTRAMUSCULAR; INTRAVENOUS EVERY 4 HOURS PRN
Status: DISCONTINUED | OUTPATIENT
Start: 2018-10-10 | End: 2018-10-11 | Stop reason: HOSPADM

## 2018-10-10 RX ORDER — FAMOTIDINE 20 MG/1
20 TABLET, FILM COATED ORAL ONCE
Status: COMPLETED | OUTPATIENT
Start: 2018-10-10 | End: 2018-10-10

## 2018-10-10 RX ORDER — IBUPROFEN 600 MG/1
600 TABLET ORAL EVERY 6 HOURS PRN
Status: DISCONTINUED | OUTPATIENT
Start: 2018-10-10 | End: 2018-10-11 | Stop reason: HOSPADM

## 2018-10-10 RX ORDER — BISACODYL 5 MG/1
10 TABLET, DELAYED RELEASE ORAL DAILY PRN
Status: DISCONTINUED | OUTPATIENT
Start: 2018-10-10 | End: 2018-10-11 | Stop reason: HOSPADM

## 2018-10-10 RX ORDER — FAMOTIDINE 10 MG/ML
20 INJECTION, SOLUTION INTRAVENOUS ONCE
Status: DISCONTINUED | OUTPATIENT
Start: 2018-10-10 | End: 2018-10-10

## 2018-10-10 RX ORDER — FENTANYL CITRATE 50 UG/ML
50 INJECTION, SOLUTION INTRAMUSCULAR; INTRAVENOUS
Status: DISCONTINUED | OUTPATIENT
Start: 2018-10-10 | End: 2018-10-10 | Stop reason: HOSPADM

## 2018-10-10 RX ORDER — VENLAFAXINE HYDROCHLORIDE 75 MG/1
150 CAPSULE, EXTENDED RELEASE ORAL DAILY
Status: DISCONTINUED | OUTPATIENT
Start: 2018-10-10 | End: 2018-10-11 | Stop reason: HOSPADM

## 2018-10-10 RX ORDER — PROPOFOL 10 MG/ML
VIAL (ML) INTRAVENOUS CONTINUOUS PRN
Status: DISCONTINUED | OUTPATIENT
Start: 2018-10-10 | End: 2018-10-10 | Stop reason: SURG

## 2018-10-10 RX ORDER — SODIUM CHLORIDE, SODIUM LACTATE, POTASSIUM CHLORIDE, CALCIUM CHLORIDE 600; 310; 30; 20 MG/100ML; MG/100ML; MG/100ML; MG/100ML
9 INJECTION, SOLUTION INTRAVENOUS CONTINUOUS
Status: DISCONTINUED | OUTPATIENT
Start: 2018-10-10 | End: 2018-10-11 | Stop reason: HOSPADM

## 2018-10-10 RX ORDER — DOXEPIN HYDROCHLORIDE 10 MG/1
10 CAPSULE ORAL NIGHTLY PRN
Status: DISCONTINUED | OUTPATIENT
Start: 2018-10-10 | End: 2018-10-11 | Stop reason: HOSPADM

## 2018-10-10 RX ORDER — GLYCOPYRROLATE 0.2 MG/ML
INJECTION INTRAMUSCULAR; INTRAVENOUS AS NEEDED
Status: DISCONTINUED | OUTPATIENT
Start: 2018-10-10 | End: 2018-10-10 | Stop reason: SURG

## 2018-10-10 RX ORDER — ONDANSETRON 2 MG/ML
4 INJECTION INTRAMUSCULAR; INTRAVENOUS EVERY 6 HOURS PRN
Status: DISCONTINUED | OUTPATIENT
Start: 2018-10-10 | End: 2018-10-11 | Stop reason: HOSPADM

## 2018-10-10 RX ORDER — MAGNESIUM HYDROXIDE 1200 MG/15ML
LIQUID ORAL AS NEEDED
Status: DISCONTINUED | OUTPATIENT
Start: 2018-10-10 | End: 2018-10-10 | Stop reason: HOSPADM

## 2018-10-10 RX ORDER — METOCLOPRAMIDE HYDROCHLORIDE 5 MG/ML
10 INJECTION INTRAMUSCULAR; INTRAVENOUS EVERY 6 HOURS PRN
Status: DISCONTINUED | OUTPATIENT
Start: 2018-10-10 | End: 2018-10-11 | Stop reason: HOSPADM

## 2018-10-10 RX ORDER — PROMETHAZINE HYDROCHLORIDE 12.5 MG/1
12.5 SUPPOSITORY RECTAL EVERY 4 HOURS PRN
Status: DISCONTINUED | OUTPATIENT
Start: 2018-10-10 | End: 2018-10-11 | Stop reason: HOSPADM

## 2018-10-10 RX ORDER — DEXAMETHASONE SODIUM PHOSPHATE 4 MG/ML
INJECTION, SOLUTION INTRA-ARTICULAR; INTRALESIONAL; INTRAMUSCULAR; INTRAVENOUS; SOFT TISSUE AS NEEDED
Status: DISCONTINUED | OUTPATIENT
Start: 2018-10-10 | End: 2018-10-10 | Stop reason: SURG

## 2018-10-10 RX ORDER — LIDOCAINE HYDROCHLORIDE 10 MG/ML
0.5 INJECTION, SOLUTION EPIDURAL; INFILTRATION; INTRACAUDAL; PERINEURAL ONCE AS NEEDED
Status: COMPLETED | OUTPATIENT
Start: 2018-10-10 | End: 2018-10-10

## 2018-10-10 RX ORDER — HYDROCODONE BITARTRATE AND ACETAMINOPHEN 5; 325 MG/1; MG/1
1 TABLET ORAL EVERY 4 HOURS PRN
Status: DISCONTINUED | OUTPATIENT
Start: 2018-10-10 | End: 2018-10-11 | Stop reason: HOSPADM

## 2018-10-10 RX ORDER — KETOROLAC TROMETHAMINE 30 MG/ML
30 INJECTION, SOLUTION INTRAMUSCULAR; INTRAVENOUS EVERY 6 HOURS PRN
Status: DISCONTINUED | OUTPATIENT
Start: 2018-10-10 | End: 2018-10-11 | Stop reason: HOSPADM

## 2018-10-10 RX ORDER — CEFAZOLIN SODIUM 2 G/100ML
2 INJECTION, SOLUTION INTRAVENOUS EVERY 8 HOURS
Status: COMPLETED | OUTPATIENT
Start: 2018-10-10 | End: 2018-10-11

## 2018-10-10 RX ORDER — ROCURONIUM BROMIDE 10 MG/ML
INJECTION, SOLUTION INTRAVENOUS AS NEEDED
Status: DISCONTINUED | OUTPATIENT
Start: 2018-10-10 | End: 2018-10-10 | Stop reason: SURG

## 2018-10-10 RX ORDER — PROPOFOL 10 MG/ML
VIAL (ML) INTRAVENOUS AS NEEDED
Status: DISCONTINUED | OUTPATIENT
Start: 2018-10-10 | End: 2018-10-10 | Stop reason: SURG

## 2018-10-10 RX ORDER — SODIUM CHLORIDE 9 MG/ML
INJECTION, SOLUTION INTRAVENOUS AS NEEDED
Status: DISCONTINUED | OUTPATIENT
Start: 2018-10-10 | End: 2018-10-10 | Stop reason: HOSPADM

## 2018-10-10 RX ORDER — KETAMINE HCL IN 0.9 % NACL 50 MG/5 ML
SYRINGE (ML) INTRAVENOUS AS NEEDED
Status: DISCONTINUED | OUTPATIENT
Start: 2018-10-10 | End: 2018-10-10 | Stop reason: SURG

## 2018-10-10 RX ORDER — NICOTINE 21 MG/24HR
1 PATCH, TRANSDERMAL 24 HOURS TRANSDERMAL
Status: DISCONTINUED | OUTPATIENT
Start: 2018-10-10 | End: 2018-10-11 | Stop reason: HOSPADM

## 2018-10-10 RX ORDER — FENTANYL CITRATE 50 UG/ML
INJECTION, SOLUTION INTRAMUSCULAR; INTRAVENOUS AS NEEDED
Status: DISCONTINUED | OUTPATIENT
Start: 2018-10-10 | End: 2018-10-10 | Stop reason: SURG

## 2018-10-10 RX ORDER — SIMETHICONE 80 MG
80 TABLET,CHEWABLE ORAL 4 TIMES DAILY PRN
Status: DISCONTINUED | OUTPATIENT
Start: 2018-10-10 | End: 2018-10-11 | Stop reason: HOSPADM

## 2018-10-10 RX ORDER — PROMETHAZINE HYDROCHLORIDE 25 MG/ML
6.25 INJECTION, SOLUTION INTRAMUSCULAR; INTRAVENOUS
Status: DISCONTINUED | OUTPATIENT
Start: 2018-10-10 | End: 2018-10-10 | Stop reason: HOSPADM

## 2018-10-10 RX ORDER — PROMETHAZINE HYDROCHLORIDE 12.5 MG/1
12.5 TABLET ORAL EVERY 4 HOURS PRN
Status: DISCONTINUED | OUTPATIENT
Start: 2018-10-10 | End: 2018-10-11 | Stop reason: HOSPADM

## 2018-10-10 RX ORDER — SODIUM CHLORIDE 0.9 % (FLUSH) 0.9 %
3 SYRINGE (ML) INJECTION EVERY 12 HOURS SCHEDULED
Status: DISCONTINUED | OUTPATIENT
Start: 2018-10-10 | End: 2018-10-10

## 2018-10-10 RX ORDER — NEOSTIGMINE METHYLSULFATE 1 MG/ML
INJECTION, SOLUTION INTRAVENOUS AS NEEDED
Status: DISCONTINUED | OUTPATIENT
Start: 2018-10-10 | End: 2018-10-10 | Stop reason: SURG

## 2018-10-10 RX ORDER — ONDANSETRON 4 MG/1
4 TABLET, FILM COATED ORAL EVERY 6 HOURS PRN
Status: DISCONTINUED | OUTPATIENT
Start: 2018-10-10 | End: 2018-10-11 | Stop reason: HOSPADM

## 2018-10-10 RX ADMIN — HYDROMORPHONE HYDROCHLORIDE: 1 INJECTION, SOLUTION INTRAMUSCULAR; INTRAVENOUS; SUBCUTANEOUS at 10:20

## 2018-10-10 RX ADMIN — EPHEDRINE SULFATE 10 MG: 50 INJECTION INTRAMUSCULAR; INTRAVENOUS; SUBCUTANEOUS at 09:10

## 2018-10-10 RX ADMIN — PROPOFOL 25 MCG/KG/MIN: 10 INJECTION, EMULSION INTRAVENOUS at 08:10

## 2018-10-10 RX ADMIN — KETOROLAC TROMETHAMINE 30 MG: 30 INJECTION, SOLUTION INTRAMUSCULAR at 09:40

## 2018-10-10 RX ADMIN — LIDOCAINE HYDROCHLORIDE 0.2 ML: 10 INJECTION, SOLUTION EPIDURAL; INFILTRATION; INTRACAUDAL; PERINEURAL at 07:29

## 2018-10-10 RX ADMIN — ROCURONIUM BROMIDE 50 MG: 10 SOLUTION INTRAVENOUS at 08:07

## 2018-10-10 RX ADMIN — CEFAZOLIN SODIUM 2 G: 2 INJECTION, SOLUTION INTRAVENOUS at 08:01

## 2018-10-10 RX ADMIN — PHENYLEPHRINE HYDROCHLORIDE 100 MCG: 10 INJECTION INTRAVENOUS at 08:56

## 2018-10-10 RX ADMIN — FENTANYL CITRATE: 50 INJECTION, SOLUTION INTRAMUSCULAR; INTRAVENOUS at 10:43

## 2018-10-10 RX ADMIN — HYDROCODONE BITARTRATE AND ACETAMINOPHEN 1 TABLET: 10; 325 TABLET ORAL at 12:32

## 2018-10-10 RX ADMIN — IBUPROFEN 600 MG: 600 TABLET ORAL at 20:45

## 2018-10-10 RX ADMIN — DEXTROSE AND SODIUM CHLORIDE 100 ML/HR: 5; 450 INJECTION, SOLUTION INTRAVENOUS at 20:45

## 2018-10-10 RX ADMIN — NICOTINE 1 PATCH: 14 PATCH TRANSDERMAL at 16:54

## 2018-10-10 RX ADMIN — CEFAZOLIN SODIUM 2 G: 2 INJECTION, SOLUTION INTRAVENOUS at 16:50

## 2018-10-10 RX ADMIN — DEXAMETHASONE SODIUM PHOSPHATE 8 MG: 4 INJECTION, SOLUTION INTRAMUSCULAR; INTRAVENOUS at 08:40

## 2018-10-10 RX ADMIN — SODIUM CHLORIDE, POTASSIUM CHLORIDE, SODIUM LACTATE AND CALCIUM CHLORIDE: 600; 310; 30; 20 INJECTION, SOLUTION INTRAVENOUS at 08:01

## 2018-10-10 RX ADMIN — LIDOCAINE HYDROCHLORIDE 50 MG: 10 INJECTION, SOLUTION EPIDURAL; INFILTRATION; INTRACAUDAL; PERINEURAL at 08:07

## 2018-10-10 RX ADMIN — KETOROLAC TROMETHAMINE 30 MG: 30 INJECTION, SOLUTION INTRAMUSCULAR at 16:51

## 2018-10-10 RX ADMIN — SIMETHICONE CHEW TAB 80 MG 80 MG: 80 TABLET ORAL at 16:51

## 2018-10-10 RX ADMIN — PROPOFOL 200 MG: 10 INJECTION, EMULSION INTRAVENOUS at 08:07

## 2018-10-10 RX ADMIN — GLYCOPYRROLATE 0.4 MG: 0.2 INJECTION, SOLUTION INTRAMUSCULAR; INTRAVENOUS at 09:44

## 2018-10-10 RX ADMIN — EPHEDRINE SULFATE 10 MG: 50 INJECTION INTRAMUSCULAR; INTRAVENOUS; SUBCUTANEOUS at 08:49

## 2018-10-10 RX ADMIN — VENLAFAXINE HYDROCHLORIDE 150 MG: 75 CAPSULE, EXTENDED RELEASE ORAL at 13:47

## 2018-10-10 RX ADMIN — FENTANYL CITRATE: 50 INJECTION, SOLUTION INTRAMUSCULAR; INTRAVENOUS at 11:10

## 2018-10-10 RX ADMIN — FAMOTIDINE 20 MG: 20 TABLET ORAL at 07:05

## 2018-10-10 RX ADMIN — SODIUM CHLORIDE 500 ML: 9 INJECTION, SOLUTION INTRAVENOUS at 13:52

## 2018-10-10 RX ADMIN — HYDROMORPHONE HYDROCHLORIDE: 1 INJECTION, SOLUTION INTRAMUSCULAR; INTRAVENOUS; SUBCUTANEOUS at 10:28

## 2018-10-10 RX ADMIN — PHENYLEPHRINE HYDROCHLORIDE 100 MCG: 10 INJECTION INTRAVENOUS at 08:53

## 2018-10-10 RX ADMIN — SODIUM CHLORIDE, POTASSIUM CHLORIDE, SODIUM LACTATE AND CALCIUM CHLORIDE 9 ML/HR: 600; 310; 30; 20 INJECTION, SOLUTION INTRAVENOUS at 07:29

## 2018-10-10 RX ADMIN — NEOSTIGMINE METHYLSULFATE 3 MG: 1 INJECTION, SOLUTION INTRAVENOUS at 09:44

## 2018-10-10 RX ADMIN — ONDANSETRON 4 MG: 2 INJECTION INTRAMUSCULAR; INTRAVENOUS at 09:40

## 2018-10-10 RX ADMIN — PHENYLEPHRINE HYDROCHLORIDE 100 MCG: 10 INJECTION INTRAVENOUS at 08:30

## 2018-10-10 RX ADMIN — KETAMINE HYDROCHLORIDE 30 MG: 100 INJECTION INTRAMUSCULAR; INTRAVENOUS at 08:24

## 2018-10-10 RX ADMIN — HYDROMORPHONE HYDROCHLORIDE 0.5 MG: 1 INJECTION, SOLUTION INTRAMUSCULAR; INTRAVENOUS; SUBCUTANEOUS at 11:23

## 2018-10-10 RX ADMIN — FENTANYL CITRATE 100 MCG: 50 INJECTION, SOLUTION INTRAMUSCULAR; INTRAVENOUS at 10:04

## 2018-10-10 RX ADMIN — PROMETHAZINE HYDROCHLORIDE: 25 INJECTION INTRAMUSCULAR; INTRAVENOUS at 10:43

## 2018-10-10 RX ADMIN — HYDROMORPHONE HYDROCHLORIDE 0.5 MG: 1 INJECTION, SOLUTION INTRAMUSCULAR; INTRAVENOUS; SUBCUTANEOUS at 11:42

## 2018-10-10 RX ADMIN — HYDROCODONE BITARTRATE AND ACETAMINOPHEN 1 TABLET: 10; 325 TABLET ORAL at 20:45

## 2018-10-10 RX ADMIN — DEXTROSE AND SODIUM CHLORIDE 100 ML/HR: 5; 450 INJECTION, SOLUTION INTRAVENOUS at 13:52

## 2018-10-10 NOTE — OP NOTE
OPERATIVE NOTE    10/10/2018    Preoperative diagnosis: chronic pelvic pain - endometriosis    Postoperative diagnosis: same with adnexal adhesions and minimal endometriosis particularly posterior cul-de-sac    Procedure: Da Jean-assisted total laparoscopic hysterectomy, bilateral salpingo-oophorectomy, lysis of adnexal adhesions, fulguration of cul-de-sac endometriosis.    Anesthesia: General    Estimated blood loss: Minimal    Brief history and indications: ID: 37 y.o.  with history of endometriosis and pain not responsive to medical management. Family completed and desires definitive treatment. Discussed ovarian preservation if possible. Questions answered pre operatively.        Procedure: The patient was taken to the operating room and placed under general anesthesia without complication.  She she was prepped and draped in the standard fashion.  She was tested for da Jean surgery in steep Trendelenburg.  A timeout was undertaken prior to proceeding.  Exam under anesthesia was done.  A Mckeon catheter was placed to straight drainage.  Speculum was placed and the cervix was visualized, it was grasped with a single-tooth tenaculum.  A double bites of 0- Vicryl suture was placed anteriorly.  The uterus was sounded.  The cervix was dilated to allow passage of the appropriate 8 cm cannula.  The appropriate small Mi ring along with vaginal occlusion balloon were placed and tied down with the Vicryl stitch.  The uterine balloon was inflated with 5 mL's of fluid. The vaginal occlusion balloon was inflated with approximately 60 mL's of fluid.    After re-gloving, attention was directed laparoscopically.  A supra umbilical incision was made after Marcaine was placed. Trocar placed.  Entry into the abdominopelvic cavity confirmed via the video laparoscope.  The DaVinci trocars were placed under direct visualization along with the assist port in the right upper quadrant.  Local was used at all port sites The  pelvis was inspected. Left colon adhesions were taken down bluntly to free up the left adnexa and bring the bowel up out of the pelvis.  After ascertaining we could proceed with surgery ; the da Jean was brought in for docking.  Once it was successfully docked and instruments placed, I left for the console.    On the left side the fallopian tube was grasped elevated and undermined with cautery and excised and removed. Adhesions were taken down sharply between the tube and ovary in between the ovary and the sidewall.  On the right ovary clear thick adhesions/psuedocysts removed.  The right fallopian tube was excised from proximal to distal using PK and scissors.  Both tubes removed to the physician assistant port..  Dissection continued across the left utero-ovarian ligaments and round ligament down alongside the uterus to the level of the cervical vessels.  Once these were cauterized attention was directed to the right side where the utero-ovarian ligaments, round ligament and broad ligament were cauterized prior to being cut down to level of the cervical vessels which were also cauterized prior to being cut.  A bladder flap was created.  The anterior Mi ring was identified.  Monopolar cautery was used to dissect down to the anterior ring.  Posteriorly some areas of atypical endometriosis were fulgurated carefully.  These consisted of clear and yellowish negrete blebs.  These were well inside the uterosacral ligaments.  The posterior ring was identified and I dissected down to it as well.  I worked my way circumferentially to free up the uterus and cervix staying inside the Mi ring.  The uterus and cervix were removed vaginally.  The pelvis was irrigated and suctioned for blood clots.   PDS Stratafix was used to close the vaginal cuff from the right angle to the left angle.  The pelvis was  again irrigated and suctioned for blood clots.  The abdomen was deflated to a pressure of 8.  With adequate hemostasis, I  closed the peritoneum with a couple of figure-of-eight Vicryl sutures..  The da Jean was undocked.    I scrubbed back in to assist in closure.  The assist port was closed with the Joselito closure device. The lateral da Jean ports were removed under direct visualization. . The incisions were closed subcuticularly with 3-0 plain suture.  Skin was closed with glue and/or Steri-Strips if indicated.  Vaginal instruments had been removed with exception of a Mckeon catheter remaining in place. Sponge and needle counts were correct.  Patient tolerated the procedure well and left for the postop recovery room in stable condition.      Israel Murphy M.D.

## 2018-10-10 NOTE — PLAN OF CARE
Problem: Surgery Nonspecified (Adult)  Goal: Anesthesia/Sedation Recovery  Outcome: Ongoing (interventions implemented as appropriate)

## 2018-10-10 NOTE — ANESTHESIA PROCEDURE NOTES
Airway  Urgency: elective    Airway not difficult    General Information and Staff    Patient location during procedure: OR  CRNA: TIA BENSON    Indications and Patient Condition  Indications for airway management: airway protection    Preoxygenated: yes  MILS not maintained throughout  Mask difficulty assessment: 1 - vent by mask    Final Airway Details  Final airway type: endotracheal airway      Successful airway: ETT  Cuffed: yes   Successful intubation technique: direct laryngoscopy  Facilitating devices/methods: intubating stylet  Endotracheal tube insertion site: oral  Blade: Brett  Blade size: 3  ETT size: 7.5 mm  Cormack-Lehane Classification: grade I - full view of glottis  Placement verified by: chest auscultation and capnometry   Measured from: lips  ETT to lips (cm): 20  Number of attempts at approach: 1    Additional Comments  Negative epigastric sounds, Breath sound equal bilaterally with symmetric chest rise and fall

## 2018-10-10 NOTE — ANESTHESIA PREPROCEDURE EVALUATION
Anesthesia Evaluation     Patient summary reviewed and Nursing notes reviewed                Airway   Mallampati: II  Dental      Pulmonary    (+) asthma,   Cardiovascular - negative cardio ROS        Neuro/Psych- negative ROS  GI/Hepatic/Renal/Endo - negative ROS     Musculoskeletal (-) negative ROS    Abdominal    Substance History - negative use     OB/GYN negative ob/gyn ROS         Other                      Anesthesia Plan    ASA 2     general

## 2018-10-10 NOTE — ANESTHESIA POSTPROCEDURE EVALUATION
Patient: Isabelle Mahoney    Procedure Summary     Date:  10/10/18 [facility] / [facility]:   [facility]    Anesthesia Start:  0801 Anesthesia Stop:      Procedure:  TOTAL LAPAROSCOPIC HYSTERECTOMY BILATERAL SALPINGECTOMY WITH DAVINCI SI ROBOT (N/A Abdomen) Diagnosis:       Endometriosis      (Endometriosis [N80.9])    Surgeon:  Israel Murphy MD Provider:  Riley Cisneros MD    Anesthesia Type:  general ASA Status:  2          Anesthesia Type: general  Last vitals  BP   124/68   Temp   97.6   Pulse   68   Resp   16     SpO2   98     Post Anesthesia Care and Evaluation    Patient location during evaluation: PACU  Patient participation: complete - patient participated  Level of consciousness: awake and responsive to verbal stimuli  Pain score: 2  Pain management: adequate  Airway patency: patent  Anesthetic complications: No anesthetic complications    Cardiovascular status: acceptable  Respiratory status: acceptable  Hydration status: acceptable    Comments: Pt awake and responsive. SV. VSS. Report to RN. Patient Vitals in the past 24 hrs:  10/10/18 0700, BP:110/71, Temp:98 °F (36.7 °C), Temp src:Temporal Art, Pulse:75, Resp:18, SpO2:94 %  133/78. p 72. r 16. t 98.1

## 2018-10-10 NOTE — INTERVAL H&P NOTE
H&P reviewed. The patient was examined and there are no changes to the H&P. discussed ovarian preservation if possible.

## 2018-10-11 VITALS
DIASTOLIC BLOOD PRESSURE: 57 MMHG | HEIGHT: 68 IN | HEART RATE: 75 BPM | TEMPERATURE: 97.6 F | WEIGHT: 154.1 LBS | OXYGEN SATURATION: 100 % | BODY MASS INDEX: 23.36 KG/M2 | SYSTOLIC BLOOD PRESSURE: 98 MMHG | RESPIRATION RATE: 16 BRPM

## 2018-10-11 PROBLEM — Z90.710 HISTORY OF ROBOT-ASSISTED LAPAROSCOPIC HYSTERECTOMY: Status: ACTIVE | Noted: 2018-10-11

## 2018-10-11 LAB
CYTO UR: NORMAL
LAB AP CASE REPORT: NORMAL
LAB AP CLINICAL INFORMATION: NORMAL
PATH REPORT.FINAL DX SPEC: NORMAL
PATH REPORT.GROSS SPEC: NORMAL

## 2018-10-11 PROCEDURE — 25010000002 KETOROLAC TROMETHAMINE PER 15 MG: Performed by: OBSTETRICS & GYNECOLOGY

## 2018-10-11 PROCEDURE — G0378 HOSPITAL OBSERVATION PER HR: HCPCS

## 2018-10-11 PROCEDURE — 25010000003 CEFAZOLIN IN DEXTROSE 2-4 GM/100ML-% SOLUTION: Performed by: OBSTETRICS & GYNECOLOGY

## 2018-10-11 RX ORDER — NICOTINE 21 MG/24HR
1 PATCH, TRANSDERMAL 24 HOURS TRANSDERMAL
Qty: 14 PATCH | Refills: 2 | Status: SHIPPED | OUTPATIENT
Start: 2018-10-11 | End: 2018-10-12

## 2018-10-11 RX ORDER — IBUPROFEN 600 MG/1
600 TABLET ORAL EVERY 8 HOURS PRN
Qty: 30 TABLET | Refills: 0 | Status: SHIPPED | OUTPATIENT
Start: 2018-10-11

## 2018-10-11 RX ORDER — HYDROCODONE BITARTRATE AND ACETAMINOPHEN 5; 325 MG/1; MG/1
1 TABLET ORAL EVERY 6 HOURS PRN
Qty: 20 TABLET | Refills: 0 | Status: SHIPPED | OUTPATIENT
Start: 2018-10-11 | End: 2018-10-20

## 2018-10-11 RX ADMIN — CEFAZOLIN SODIUM 2 G: 2 INJECTION, SOLUTION INTRAVENOUS at 00:20

## 2018-10-11 RX ADMIN — IBUPROFEN 600 MG: 600 TABLET ORAL at 03:05

## 2018-10-11 RX ADMIN — HYDROCODONE BITARTRATE AND ACETAMINOPHEN 1 TABLET: 5; 325 TABLET ORAL at 08:02

## 2018-10-11 RX ADMIN — ZOLPIDEM TARTRATE 5 MG: 5 TABLET ORAL at 00:24

## 2018-10-11 RX ADMIN — KETOROLAC TROMETHAMINE 30 MG: 30 INJECTION, SOLUTION INTRAMUSCULAR at 08:01

## 2018-10-11 RX ADMIN — VENLAFAXINE HYDROCHLORIDE 150 MG: 75 CAPSULE, EXTENDED RELEASE ORAL at 09:00

## 2018-10-11 RX ADMIN — HYDROCODONE BITARTRATE AND ACETAMINOPHEN 1 TABLET: 10; 325 TABLET ORAL at 03:05

## 2018-10-11 NOTE — PLAN OF CARE
Problem: Patient Care Overview  Goal: Plan of Care Review  Outcome: Ongoing (interventions implemented as appropriate)   10/11/18 0154   Coping/Psychosocial   Plan of Care Reviewed With patient;parent   Plan of Care Review   Progress improving   OTHER   Outcome Summary ambulated in hallway, good uop, pain minimal.probable dc today.     Goal: Individualization and Mutuality  Outcome: Ongoing (interventions implemented as appropriate)    Goal: Discharge Needs Assessment  Outcome: Ongoing (interventions implemented as appropriate)    Goal: Interprofessional Rounds/Family Conf  Outcome: Ongoing (interventions implemented as appropriate)      Problem: Hysterectomy (Adult)  Goal: Signs and Symptoms of Listed Potential Problems Will be Absent, Minimized or Managed (Hysterectomy)  Outcome: Ongoing (interventions implemented as appropriate)
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
## 2018-10-12 RX ORDER — NICOTINE 21 MG/24HR
1 PATCH, TRANSDERMAL 24 HOURS TRANSDERMAL
Qty: 14 PATCH | Refills: 1 | Status: SHIPPED | OUTPATIENT
Start: 2018-10-12 | End: 2018-10-22 | Stop reason: SDDI

## 2018-10-22 ENCOUNTER — OFFICE VISIT (OUTPATIENT)
Dept: OBSTETRICS AND GYNECOLOGY | Facility: CLINIC | Age: 38
End: 2018-10-22

## 2018-10-22 VITALS
WEIGHT: 158 LBS | BODY MASS INDEX: 24.03 KG/M2 | SYSTOLIC BLOOD PRESSURE: 110 MMHG | RESPIRATION RATE: 16 BRPM | DIASTOLIC BLOOD PRESSURE: 60 MMHG

## 2018-10-22 DIAGNOSIS — Z09 SURGERY FOLLOW-UP: Primary | ICD-10-CM

## 2018-10-22 PROCEDURE — 99024 POSTOP FOLLOW-UP VISIT: CPT | Performed by: OBSTETRICS & GYNECOLOGY

## 2018-10-22 NOTE — PROGRESS NOTES
Subjective   Chief Complaint   Patient presents with   • Post-op     s/p TLH, B&S 10/10/18     Isabelle Mahoney is a 37 y.o. year old  presenting to be seen for her post-operative visit.  She had a bilateral salpingectomy and TLH.  Currently she reports no problems with eating, bowel movements, voiding, or wound drainage and pain is well controlled.  The exception would be the right upper quadrant incision is sore in her abdomen on that side is sore.  She is also having a vaginal discharge is Yellowish.  No odor or itching.  Has not done much at all activity wise.  She is currently in rehabilitation.  Discussed that she should build certainly more lifting etc. so she is back to normal to 3 weeks postop.  No intercourse for 6 weeks.  That is not an issue currently.    The following portions of the patient's history were reviewed and updated as appropriate:problem list, current medications and allergies    Review of Systems please see above     Objective   /60   Resp 16   Wt 71.7 kg (158 lb)   LMP 2018   BMI 24.03 kg/m²     General:  well developed; well nourished  no acute distress  appears stated age   Abdomen: soft, non-tender; no masses  no umbilical or inginual hernias are present  no hepato-splenomegaly  incision is clean, dry, intact and The glue is still drying on the incisions.  The right upper quadrant incision is slightly tender no erythema around any of them they're all healing well.   Pelvis: Clinical staff was present for exam  External genitalia:  normal appearance of the external genitalia including Bartholin's and [place]'s glands.  :  urethral meatus normal;  Vaginal:  normal pink mucosa without prolapse or lesions. discharge present -  yellow, white, thick and There is an area of granulation about 6 mm in diameter just to the right of midline touched up with silver nitrate.;  Uterus:  yellow, white, thick and There is a small amount of granulation tissue maybe 5-6 mm in diameter  just to the right of midline at the cuff touched with silver nitrate  Adnexa:  normal bimanual exam of the adnexa.  Rectal:  digital rectal exam not performed; anus visually normal appearing.          Assessment   1. Doing well postop about day 10.  Pathology reviewed benign endometriosis and fibroids.  Scar tissue seen on pictures was reviewed as well.  Discussed that she concern increasing activity.     Plan   1. Increase back to normal activity over the next 7-10 days no intercourse for 6 weeks  2. We'll see her back in about 4 weeks.  Sooner she has any problems with vaginal discharge    Medications Rx this encounter:  No orders of the defined types were placed in this encounter.         This note was electronically signed.    Israel Murphy MD  October 22, 2018

## 2018-10-24 ENCOUNTER — TELEPHONE (OUTPATIENT)
Dept: OBSTETRICS AND GYNECOLOGY | Facility: CLINIC | Age: 38
End: 2018-10-24

## 2018-10-24 NOTE — TELEPHONE ENCOUNTER
We treated a a spot with silver nitrate.  Some granulation tissue.  Unless bothersome months follow-up as scheduled.  If this picks up we can work her and the placement more silver nitrate and/or Monsel solution

## 2018-10-24 NOTE — TELEPHONE ENCOUNTER
S/p [facility] with B&S 10/10/18, was in office on 10/22/18, since Monday some pinkish spotting.

## 2018-10-24 NOTE — TELEPHONE ENCOUNTER
ROHAN JAY IS CALLING THIS MORNING SHE IS HAVING SOME BLEEDING AND WASN'T SURE IF NORMAL - WEARING A PAD  AND THE BLOOD IS PINKISH RED    WANTS TO SPEAK TO A NURSE     PLEASE CALL HER [phone]

## 2018-10-25 ENCOUNTER — TELEPHONE (OUTPATIENT)
Dept: OBSTETRICS AND GYNECOLOGY | Facility: CLINIC | Age: 38
End: 2018-10-25

## 2018-10-31 ENCOUNTER — OFFICE VISIT (OUTPATIENT)
Dept: OBSTETRICS AND GYNECOLOGY | Facility: CLINIC | Age: 38
End: 2018-10-31

## 2018-10-31 VITALS
BODY MASS INDEX: 23.42 KG/M2 | RESPIRATION RATE: 16 BRPM | DIASTOLIC BLOOD PRESSURE: 60 MMHG | SYSTOLIC BLOOD PRESSURE: 116 MMHG | WEIGHT: 154 LBS

## 2018-10-31 DIAGNOSIS — Z09 SURGERY FOLLOW-UP: Primary | ICD-10-CM

## 2018-10-31 PROCEDURE — 99024 POSTOP FOLLOW-UP VISIT: CPT | Performed by: OBSTETRICS & GYNECOLOGY

## 2018-10-31 NOTE — PROGRESS NOTES
Subjective   Chief Complaint   Patient presents with   • Post-op     pain and bleeding     Isabelle Mahoney is a 37 y.o. year old  presenting to be seen for her post-operative visit.  She had a bilateral salpingectomy and TLH.  Currently she reports right lower abdominal pain cramping like a period.  Also had some's yellowish discharge.  Spotted after I saw her last week never saw any black or grey discharge with silver nitrate but one they had a lot of blood she reports.    The following portions of the patient's history were reviewed and updated as appropriate:problem list, current medications and allergies    Review of Systems normal bladder and bowels     Objective   /60   Resp 16   Wt 69.9 kg (154 lb)   LMP 2018   BMI 23.42 kg/m²     General:  well developed; well nourished  no acute distress  appears stated age   Abdomen: no umbilical or inginual hernias are present  no hepato-splenomegaly  Tender on the right and suprapubically   Pelvis: Clinical staff was present for exam  External genitalia:  normal appearance of the external genitalia including Bartholin's and [place]'s glands.  :  urethral meatus normal;  Vaginal:  normal pink mucosa without prolapse or lesions. blood present -  Actually pinkish discharge at the cuff.  There is a linear area may be 1 cm long by maybe 10 mm at the most that is pink.  Silver nitrate reapplied.  There was no active bleeding from this area.  On bimanual examination no hematomas noted.;  Cervix:  absent.  Uterus:  absent.  Adnexa:  non palpable bilaterally.  Rectal:  digital rectal exam not performed; anus visually normal appearing.          Assessment   1. Slight bleeding from vaginal cuff should resolve on its own or with silver nitrate/Monsels treatment.  Cuff itself was intact I do not feel any hematoma.     Plan   1. Reassurance  2. Okay to do any activities this will not aggravate the bleeding and/or her right sided pain.  There is no mass or there.   This could be related to adhesions?    Medications Rx this encounter:  No orders of the defined types were placed in this encounter.         This note was electronically signed.    Israel Murphy MD  October 31, 2018

## 2018-11-19 ENCOUNTER — OFFICE VISIT (OUTPATIENT)
Dept: OBSTETRICS AND GYNECOLOGY | Facility: CLINIC | Age: 38
End: 2018-11-19

## 2018-11-19 VITALS
WEIGHT: 158 LBS | DIASTOLIC BLOOD PRESSURE: 60 MMHG | SYSTOLIC BLOOD PRESSURE: 110 MMHG | BODY MASS INDEX: 24.03 KG/M2 | RESPIRATION RATE: 16 BRPM

## 2018-11-19 DIAGNOSIS — Z09 SURGERY FOLLOW-UP: Primary | ICD-10-CM

## 2018-11-19 PROCEDURE — 99024 POSTOP FOLLOW-UP VISIT: CPT | Performed by: OBSTETRICS & GYNECOLOGY

## 2018-11-19 RX ORDER — HEPATITIS A VACCINE 1440 [IU]/ML
INJECTION, SUSPENSION INTRAMUSCULAR
Refills: 0 | COMMUNITY
Start: 2018-10-18

## 2018-11-19 NOTE — PROGRESS NOTES
Subjective   Chief Complaint   Patient presents with   • Post-op     s/p TLH B&S 10/10/18     Isabelle Mahoney is a 38 y.o. year old  presenting to be seen for her post-operative visit.  She had a TLH.  Currently she reports right sided pain otherwise okay.    The following portions of the patient's history were reviewed and updated as appropriate:problem list, current medications and allergies    Review of Systems no problems with bladder or bowels     Objective   /60   Resp 16   Wt 71.7 kg (158 lb)   LMP 2018   BMI 24.03 kg/m²     General:  well developed; well nourished  no acute distress  appears stated age   Abdomen: soft, non-tender; no masses  no umbilical or inginual hernias are present  no hepato-splenomegaly  incision is clean, dry and intact   Pelvis: Clinical staff was present for exam  External genitalia:  normal appearance of the external genitalia including Bartholin's and [place]'s glands.  :  urethral meatus normal;  Vaginal:  normal pink mucosa without prolapse or lesions. Minimal discharge at cuff  Cervix:  absent. Cervical cuff healing well intact  Uterus:  absent.  Adnexa:  normal bimanual exam of the adnexa.  Rectal:  digital rectal exam not performed; anus visually normal appearing.          Assessment   1. Doing well postoperatively.  Discussed nature of endometriosis she may have some chronic right-sided pain.  No masses present no significant endometriosis was noted on that side.  Could be some nerve sensitization from endometriosis inflammatory type of reaction.  2. Discussed that if this pain persists on right side we can treat with some progestins such as Aygestin may need to do long-term if pain persists.     Plan   1. Reassurance that all is going well she may have some mild aching pain on the right side secondary to above  2. Normal activities including intercourse.  3. Annual or as needed    Medications Rx this encounter:  No orders of the defined types were  placed in this encounter.         This note was electronically signed.    Israel Murphy MD  November 19, 2018

## 2019-01-09 ENCOUNTER — TELEPHONE (OUTPATIENT)
Dept: OBSTETRICS AND GYNECOLOGY | Facility: CLINIC | Age: 39
End: 2019-01-09

## 2019-01-10 RX ORDER — ACYCLOVIR 400 MG/1
400 TABLET ORAL 3 TIMES DAILY
Qty: 15 TABLET | Refills: 6 | Status: SHIPPED | OUTPATIENT
Start: 2019-01-10

## 2019-03-28 ENCOUNTER — TELEPHONE (OUTPATIENT)
Dept: OBSTETRICS AND GYNECOLOGY | Facility: CLINIC | Age: 39
End: 2019-03-28

## 2019-03-28 DIAGNOSIS — N95.1 MENOPAUSAL SYMPTOMS: Primary | ICD-10-CM

## 2019-03-28 NOTE — TELEPHONE ENCOUNTER
Pt is calling she had a hysterectomy and she is currently having symptoms of menopause - sweating horribly please call and let her know what she needs to do

## 2019-03-28 NOTE — TELEPHONE ENCOUNTER
I placed orders for some lab work to see if it is her thyroid and/or ovarian failure.  She is status post robotic assisted hysterectomy relatively recently

## 2019-04-01 ENCOUNTER — TELEPHONE (OUTPATIENT)
Dept: OBSTETRICS AND GYNECOLOGY | Facility: CLINIC | Age: 39
End: 2019-04-01

## 2019-04-01 DIAGNOSIS — N95.1 MENOPAUSAL SYMPTOMS: ICD-10-CM

## 2019-04-01 NOTE — TELEPHONE ENCOUNTER
I finally see these.  They were all normal so she is not hypothyroid and she is not postmenopausal.  Thanks

## 2019-04-01 NOTE — TELEPHONE ENCOUNTER
My lab section says that those all need to be collected?  [facility]?
